# Patient Record
Sex: FEMALE | Race: WHITE
[De-identification: names, ages, dates, MRNs, and addresses within clinical notes are randomized per-mention and may not be internally consistent; named-entity substitution may affect disease eponyms.]

---

## 2018-07-04 ENCOUNTER — HOSPITAL ENCOUNTER (EMERGENCY)
Dept: HOSPITAL 44 - ED | Age: 43
Discharge: HOME | End: 2018-07-04
Payer: COMMERCIAL

## 2018-07-04 VITALS — SYSTOLIC BLOOD PRESSURE: 117 MMHG | DIASTOLIC BLOOD PRESSURE: 73 MMHG
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
## 2018-07-04 DIAGNOSIS — S61.211A: Primary | ICD-10-CM

## 2018-07-04 PROCEDURE — 90471 IMMUNIZATION ADMIN: CPT

## 2018-07-04 PROCEDURE — 90715 TDAP VACCINE 7 YRS/> IM: CPT

## 2018-07-04 RX ADMIN — CLOSTRIDIUM TETANI TOXOID ANTIGEN (FORMALDEHYDE INACTIVATED), CORYNEBACTERIUM DIPHTHERIAE TOXOID ANTIGEN (FORMALDEHYDE INACTIVATED), BORDETELLA PERTUSSIS TOXOID ANTIGEN (GLUTARALDEHYDE INACTIVATED), BORDETELLA PERTUSSIS FILAMENTOUS HEMAGGLUTININ ANTIGEN (FORMALDEHYDE INACTIVATED), BORDETELLA PERTUSSIS PERTACTIN ANTIGEN, AND BORDETELLA PERTUSSIS FIMBRIAE 2/3 ANTIGEN ONE ML: 5; 2; 2.5; 5; 3; 5 INJECTION, SUSPENSION INTRAMUSCULAR at 20:04

## 2018-07-04 RX ADMIN — BACITRACIN ZINC, NEOMYCIN, POLYMYXIN B ONE EACH: 400; 3.5; 5 OINTMENT TOPICAL at 20:15

## 2018-07-04 NOTE — ED PHYSICIAN DOCUMENTATION
General Adult





- HISTORIAN


Historian: patient, spouse





- HPI


Stated Complaint: First finger laceration


Chief Complaint: General Adult


Additional Information: 





Cut finger with serrated kitchen knife just prior to coming to ER. Unknown last 

tetanus. No associated signs and no modifying factors. 





- ROS


CONST: no problems





- PAST HX


Past History: other (hypothyroid)


Allergies/Adverse Reactions: 


 Allergies











Allergy/AdvReac Type Severity Reaction Status Date / Time


 


No Known Allergies Allergy   Unverified 07/04/18 19:57














Home Medications: 


 Ambulatory Orders











 Medication  Instructions  Recorded


 


Etonogestrel/Ethinyl Estradiol 1 applic VG MONTH 07/04/18





[Nuvaring Vaginal Ring]  


 


Folic Acid [Folvite] 1 mg PO DAILY 07/04/18


 


Levothyroxine Sodium [Synthroid] 75 mcg PO DAILY 07/04/18


 


Metformin HCl [Metformin HCl ER] 500 mg PO DAILY 07/04/18














- SOCIAL HX


Smoking History: non-smoker





- FAMILY HX


Family History: No (no signif)





- VITAL SIGNS


Vital Signs: 





 Vital Signs











Temp Pulse Resp BP Pulse Ox


 


 98.5 F   76   16   117/73   96 


 


 07/04/18 19:52  07/04/18 19:52  07/04/18 19:52  07/04/18 19:52  07/04/18 19:52














- REVIEWED ASSESSMENTS


Nursing Assessment  Reviewed: Yes


Vitals Reviewed: Yes





ED Results Lab/Radiology





- Orders


Orders: 





 ED Orders











 Category Date Time Status


 


 Apply occlusive dressing D Care  07/04/18 19:58 Ordered


 


 Cleanse with NS and Chlorhexid 1T Care  07/04/18 19:58 Ordered


 


 Finger Splint 1T Care  07/04/18 19:58 Ordered


 


 Diph,Pertuss(Acell),Tet Vac/Pf [Adacel] Med  07/04/18 19:58 Once





 0.5 ml IM .ONCE ONE   


 


 Neomycin/Bacitracin/Polymyxinb [Triple Antibiotic Med  07/04/18 19:58 Once





 Ointment]   





 1 each TP NOW ONE   














General Adult Physical Exam





- PHYSICAL EXAM


GENERAL APPEARANCE: mild distress


EENT: eye inspection normal, ENT inspection normal


NECK: normal inspection


RESPIRATORY: no resp distress


BACK: other (werect posture)


SKIN: warm/dry, normal color, other (1 cm lac palmar suraface left index finger

, prox phalanx. no active bleeding, Superficial)


EXTREMITIES: non-tender, normal range of motion, no evidence of injury


NEURO: CN's nml as tested, motor nml, cognition normal





Discharge


Clincal Impression: 


 Finger laceration





Referrals: 


Primary Doctor,No [Primary Care Provider] - 2 Days


Condition: Good


Disposition: 01 HOME, SELF-CARE


Decision to Admit: NO


Decision Time: 20:07